# Patient Record
Sex: MALE | Race: WHITE | Employment: FULL TIME | URBAN - METROPOLITAN AREA
[De-identification: names, ages, dates, MRNs, and addresses within clinical notes are randomized per-mention and may not be internally consistent; named-entity substitution may affect disease eponyms.]

---

## 2020-11-25 ENCOUNTER — OFFICE VISIT (OUTPATIENT)
Dept: ORTHOPEDIC SURGERY | Age: 30
End: 2020-11-25
Payer: COMMERCIAL

## 2020-11-25 VITALS — TEMPERATURE: 97.7 F | BODY MASS INDEX: 23.03 KG/M2 | HEIGHT: 72 IN | WEIGHT: 170 LBS

## 2020-11-25 PROCEDURE — 99203 OFFICE O/P NEW LOW 30 MIN: CPT | Performed by: ORTHOPAEDIC SURGERY

## 2020-11-25 PROCEDURE — 29405 APPL SHORT LEG CAST: CPT | Performed by: ORTHOPAEDIC SURGERY

## 2020-11-25 ASSESSMENT — ENCOUNTER SYMPTOMS
RESPIRATORY NEGATIVE: 1
GASTROINTESTINAL NEGATIVE: 1
ALLERGIC/IMMUNOLOGIC COMMENTS: PEANUTS
EYES NEGATIVE: 1
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
## 2020-12-10 ENCOUNTER — TELEPHONE (OUTPATIENT)
Dept: ORTHOPEDIC SURGERY | Age: 30
End: 2020-12-10

## 2020-12-10 ENCOUNTER — OFFICE VISIT (OUTPATIENT)
Dept: ORTHOPEDIC SURGERY | Age: 30
End: 2020-12-10
Payer: COMMERCIAL

## 2020-12-10 VITALS — HEIGHT: 72 IN | WEIGHT: 170 LBS | BODY MASS INDEX: 23.03 KG/M2 | TEMPERATURE: 97.7 F

## 2020-12-10 PROCEDURE — 99213 OFFICE O/P EST LOW 20 MIN: CPT | Performed by: ORTHOPAEDIC SURGERY

## 2020-12-10 PROCEDURE — L4360 PNEUMAT WALKING BOOT PRE CST: HCPCS | Performed by: ORTHOPAEDIC SURGERY

## 2020-12-10 NOTE — LETTER
[facility] [address]  [address] [facility] [address]  Phone: [phone]  Fax: [phone]    Gabi Gutierrez MD        December 10, 2020     Patient: Conor Mims   YOB: 1990   Date of Visit: 12/10/2020       To Whom it May Concern:    Conor Mims was seen in my clinic on 12/10/2020. He is non-weight bearing in boot and on crutches. If you have any questions or concerns, please don't hesitate to call.     Sincerely,           Gabi Gutierrez MD

## 2020-12-10 NOTE — PROGRESS NOTES
Chelle Moralez returns today to follow-up his left ankle. He reports 0 pain during the day but about 4 at nighttime. Has been diligent in his nonweightbearing use of his cast.          Patient's medications, allergies, past medical, surgical, social and family histories were reviewed and updated as appropriate. Relevant review of systems reviewed. General Exam:    Vitals: Temperature 97.7 °F (36.5 °C), temperature source Temporal, height 6' (1.829 m), weight 170 lb (77.1 kg). Constitutional: Patient is adequately groomed with no evidence of malnutrition  Mental Status: The patient is oriented to time, place and person. The patient's mood and affect are appropriate. Removed cast from left ankle today. Neurovascular exams are normal.  Calf is soft. He has mild tenderness laterally over the distal fibula. He has some ecchymosis. He has no pain with passive motion and no crepitation or gross instability. Three-view left ankle x-rays obtained today AP, lateral, and mortise views demonstrate: No displacement of his spiral fibular shaft fracture. Assessment: Stable left fibular shaft fracture. Plan: He will go into a tall boot today. He will remain nonweightbearing he can remove the boot only for hygiene. Follow-up with me in 2 weeks to determine progression of weightbearing. Procedures    Kristie / [address] Tall Walking Boot     Patient was prescribed a Tall Walking Boot. The left ankle will require stabilization / immobilization from this semi-rigid / rigid orthosis to improve their function. The orthosis will assist in protecting the affected area, provide functional support and facilitate healing. Patient was instructed to progress ambulation  as non weight bearing in the device. The plan of care is to progress the patient to full weight bearing status.      The patient was educated and fit by a healthcare professional with expert knowledge and specialization in brace application while under the direct supervision of the physician. Verbal and written instructions for the use of and application of this item were provided. They were instructed to contact the office immediately should the brace result in increased pain, decreased sensation, increased swelling or worsening of the condition. This note was created using voice recognition software. It has been proofread, but occasionally errors remain. Please disregard these errors. They will be corrected as they are noted.

## 2020-12-10 NOTE — TELEPHONE ENCOUNTER
12/10/20 [facility]    -  NO PRECERT REQUIRED - PER HI @ [facility]  REF # [id number]  MP

## 2020-12-14 ENCOUNTER — TELEPHONE (OUTPATIENT)
Dept: ORTHOPEDIC SURGERY | Age: 30
End: 2020-12-14

## 2020-12-23 ENCOUNTER — OFFICE VISIT (OUTPATIENT)
Dept: ORTHOPEDIC SURGERY | Age: 30
End: 2020-12-23
Payer: COMMERCIAL

## 2020-12-23 VITALS — WEIGHT: 175 LBS | TEMPERATURE: 97.7 F | HEIGHT: 72 IN | BODY MASS INDEX: 23.7 KG/M2

## 2020-12-23 PROCEDURE — 99213 OFFICE O/P EST LOW 20 MIN: CPT | Performed by: ORTHOPAEDIC SURGERY

## 2020-12-23 NOTE — LETTER
[facility] [address]  Phone: [phone]  Fax: [phone]    Jonelle Read MD        December 23, 2020     Patient: Curtis Juarez   YOB: 1990   Date of Visit: 12/23/2020       To Whom It May Concern: It is my medical opinion that Curtis Juarez must wear the boot and use crutches. He will be reevaluated in 3 weeks. If you have any questions or concerns, please don't hesitate to call.     Sincerely,        Jonelle Read MD

## 2020-12-23 NOTE — PROGRESS NOTES
Carlene Dickerson returns today for his left fibula fracture. He is doing well and reports no pain. I have reviewed the patient's medical history in detail and updated the computerized patient record. General Exam:    Vitals: Temperature 97.7 °F (36.5 °C), temperature source Temporal, height 6' (1.829 m), weight 175 lb (79.4 kg). Constitutional: Patient is adequately groomed with no evidence of malnutrition  Mental Status: The patient is oriented to time, place and person. The patient's mood and affect are appropriate. Left ankle today is nontender. He has no pain with flexion or extension. No pain with inversion or eversion. No pain over the distal fibula. He has no swelling. Three-view x-rays left ankle obtained today AP, lateral, and mortise views demonstrate: Early callus formation without displacement of his spiral fibular shaft fracture. Assessment: Stable and healing left fibula fracture. Plan: He can begin to gradually bear weight. He will follow-up with me in 3 weeks. This note was created using voice recognition software. It has been proofread, but occasionally errors remain. Please disregard these errors. They will be corrected as they are noted.

## 2021-01-14 ENCOUNTER — OFFICE VISIT (OUTPATIENT)
Dept: ORTHOPEDIC SURGERY | Age: 31
End: 2021-01-14
Payer: COMMERCIAL

## 2021-01-14 VITALS — WEIGHT: 175 LBS | TEMPERATURE: 97.1 F | HEIGHT: 72 IN | BODY MASS INDEX: 23.7 KG/M2

## 2021-01-14 DIAGNOSIS — M25.572 ACUTE LEFT ANKLE PAIN: Primary | ICD-10-CM

## 2021-01-14 DIAGNOSIS — S82.445D CLOSED NONDISPLACED SPIRAL FRACTURE OF SHAFT OF LEFT FIBULA WITH ROUTINE HEALING, SUBSEQUENT ENCOUNTER: ICD-10-CM

## 2021-01-14 PROCEDURE — 99213 OFFICE O/P EST LOW 20 MIN: CPT | Performed by: ORTHOPAEDIC SURGERY

## 2021-01-14 NOTE — PROGRESS NOTES
Viral Mcclure returns today to follow-up his left ankle. He reports no pain. He still been hesitant to bear weight but is pain-free at this time. Past medical, surgical, social histories have been reviewed and updated. Relevant review of systems reviewed. General Exam:    Vitals: Temperature 97.1 °F (36.2 °C), temperature source Temporal, height 6' (1.829 m), weight 175 lb (79.4 kg). Constitutional: Patient is adequately groomed with no evidence of malnutrition  Mental Status: The patient is oriented to time, place and person. The patient's mood and affect are appropriate. Left ankle today has no tenderness. He has atrophy of the calf. Sensation is normal.  Mild restriction in motion but no significant swelling. Calf is soft. Three-view left ankle x-rays obtained that AP, lateral, and mortise views demonstrate: Continued consolidation of his spiral fibular shaft fracture. No evidence of ankle joint incongruity. Assessment:  Continues to heal his left ankle fracture. Plan: He can bear weight to tolerance of the boot. Were to start therapy with an emphasis on strength and stability. He can resume light duty work when he is comfortable and off his crutches. Follow-up with me in a month. This note was created using voice recognition software. It has been proofread, but occasionally errors remain. Please disregard these errors. They will be corrected as they are noted.

## 2021-01-14 NOTE — LETTER
[facility] [address] [place] [id number]  Phone: [phone]  Fax: [phone]    Ivy Bauer MD        January 14, 2021     Patient: Christal Agarwal   YOB: 1990   Date of Visit: 1/14/2021       To Whom it May Concern:    Christal Agarwal was seen in my clinic on 1/14/2021. He is weight bearing as tolerated in boot. He will return to light duty as soon as possible. He will start physical therapy and will follow up in 1 month. If you have any questions or concerns, please don't hesitate to call.     Sincerely,           Ivy Bauer MD

## 2021-01-19 ENCOUNTER — HOSPITAL ENCOUNTER (OUTPATIENT)
Dept: PHYSICAL THERAPY | Age: 31
Setting detail: THERAPIES SERIES
Discharge: HOME | End: 2021-01-19
Payer: COMMERCIAL

## 2021-01-19 PROCEDURE — 97110 THERAPEUTIC EXERCISES: CPT | Performed by: PHYSICAL THERAPIST

## 2021-01-19 PROCEDURE — 97161 PT EVAL LOW COMPLEX 20 MIN: CPT | Performed by: PHYSICAL THERAPIST

## 2021-01-19 PROCEDURE — 97530 THERAPEUTIC ACTIVITIES: CPT | Performed by: PHYSICAL THERAPIST
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
## 2021-01-19 NOTE — FLOWSHEET NOTE
comments   ROM     ABC'S 1x    BAPS NV? Circles 30x CW/CCW   Ankle Pumps 30x    Inversion/Eversion 30x         Rocks     Towels     Toe curls     Bottle Roll               Stretching     Towel Pull 1 3x:30 Progress NV   Towel Pull 2 3x:30 Progress NV   Calf 1     Calf 2     Incline Stretch     Stair Stretch     Pro-Stretch     Toe Extension     ERMI          Hamstring                         Isometrics     Dorsiflexion 10x:10    Plantarflexion 10x:10    Inversion 10x:10    Eversion 10x:10         PRE's     Dorsiflexion     Plantarflexion     Inversion     Eversion          SLR flex     SLR ABD     SLR ADD     SLR ext          Calf Raises     Calf Raises off step          Soleus Calf raises               Step Up          Knee Extension     Hamstring Curls               Leg Press               Balance:     Rocker Board     BOSU     SLS     Aeromat     Foam Roll     Plyoback     Tandem Stance     Biodex          Bike     Treadmill          Cone Walks                          Access Code: [email or web address]   URL: [email or web address]   Date: 01/19/2021   Prepared by: Katya Nash Cessna     Exercises  Supine Ankle Pumps - 10 reps - 3 sets - 2x daily - 7x weekly  Supine Ankle Inversion Eversion AROM - 10 reps - 3 sets - 2x daily - 7x weekly  Supine Ankle Circles - 10 reps - 3 sets - 2x daily - 7x weekly  Seated Ankle Alphabet - 10 reps - 3 sets - 2x daily - 7x weekly  Long Sitting Calf Stretch with Strap - 5 sets - 30 hold - 2x daily - 7x weekly  Seated Soleus Stretch with Strap - 5 sets - 30 hold - 2x daily - 7x weekly  Isometric Ankle Eversion at Wall - 10 reps - 10 hold - 1-2x daily - 7x weekly  Isometric Ankle Inversion at Wall - 10 reps - 10 hold - 1-2x daily - 7x weekly  Isometric Ankle Dorsiflexion and Plantarflexion - 10 reps - 10 hold - 1-2x daily - 7x weekly  Long Sitting Isometric Ankle Plantarflexion with Ball at [place] - 10 reps - 10 hold - 1-2x daily - 7x weekly      Therapeutic Exercise and NMR EXR  [x] (50491) Provided verbal/tactile cueing for activities related to strengthening, flexibility, endurance, ROM for improvements in LE, proximal hip, and core control with self care, mobility, lifting, ambulation. [x] (38747) Provided verbal/tactile cueing for activities related to improving balance, coordination, kinesthetic sense, posture, motor skill, proprioception  to assist with LE, proximal hip, and core control in self care, mobility, lifting, ambulation and eccentric single leg control.      NMR and Therapeutic Activities:    [x] (76828 or 93037) Provided verbal/tactile cueing for activities related to improving balance, coordination, kinesthetic sense, posture, motor skill, proprioception and motor activation to allow for proper function of core, proximal hip and LE with self care and ADLs  [x] (49810) Gait Re-education- Provided training and instruction to the patient for proper LE, core and proximal hip recruitment and positioning and eccentric body weight control with ambulation re-education including up and down stairs     Home Exercise Program:    [x] (98780) Reviewed/Progressed HEP activities related to strengthening, flexibility, endurance, ROM of core, proximal hip and LE for functional self-care, mobility, lifting and ambulation/stair navigation   [x] (62643)Reviewed/Progressed HEP activities related to improving balance, coordination, kinesthetic sense, posture, motor skill, proprioception of core, proximal hip and LE for self care, mobility, lifting, and ambulation/stair navigation      Manual Treatments:  PROM / STM / Oscillations-Mobs:  G-I, II, III, IV (PA's, Inf., Post.)  [x] (01588) Provided manual therapy to mobilize LE, proximal hip and/or LS spine soft tissue/joints for the purpose of modulating pain, promoting relaxation,  increasing ROM, reducing/eliminating soft tissue swelling/inflammation/restriction, improving soft tissue extensibility and allowing for proper ROM for normal function with self care, mobility, lifting and ambulation. Modalities:      Charges:  Timed Code Treatment Minutes: 25'   Total Treatment Minutes: 54'     [x] EVAL (LOW) 74538   [] EVAL (MOD) 48663   [] EVAL (HIGH) 70185   [] RE-EVAL   [x] MIRNA(91404) x 1    [] IONTO  [] NMR (94254) x     [] VASO  [] Manual (95093) x      [] Other:  [x] TA x 1     [] Mech Traction (51557)  [] ES(attended) (09649)      [] ES (un) (38749):         GOALS:   Patient stated goal: regain ability to be on feet all day for work; athleticism     [] Progressing: [] Met: [] Not Met: [] Adjusted    Therapist goals for Patient:   Short Term Goals: To be achieved in: 2 weeks  1. Independent in HEP and progression per patient tolerance, in order to prevent re-injury. [] Progressing: [] Met: [] Not Met: [] Adjusted   2. Patient will have a decrease in pain of 4/10 at worst to facilitate improvement in movement, function, and ADLs as indicated by Functional Deficits. [] Progressing: [] Met: [] Not Met: [] Adjusted    Long Term Goals: To be achieved in: 6 weeks  1. Pt will score 90% or better for the LEFS to assist with reaching prior level of function. [] Progressing: [] Met: [] Not Met: [] Adjusted  2. Patient will demonstrate increased AROM in dorsiflexion greater than or equal to 10, plantarflexion greater than or equal to 50, inversion greater than or equal to 35 to allow for proper joint functioning as indicated by patients functional deficits. [] Progressing: [] Met: [] Not Met: [] Adjusted  3. Patient will demonstrate an increase in strength greater than or equal to 4+ to allow for proper functional mobility as indicated by patients functional deficits. [] Progressing: [] Met: [] Not Met: [] Adjusted  4. Patient will return to work related activities without increased symptoms or restriction. [] Progressing: [] Met: [] Not Met: [] Adjusted  5. Pt will report pain at worst less than or equal to 2/10.   [] Progressing: [] Met: [] Not Met: [] Adjusted   5. Pt will return to sport related activities without c/o. [] Progressing: [] Met: [] Not Met: [] Adjusted    Overall Progression Towards Functional goals/ Treatment Progress Update:  [] Patient is progressing as expected towards functional goals listed. [] Progression is slowed due to complexities/Impairments listed. [] Progression has been slowed due to co-morbidities. [x] Plan just implemented, too soon to assess goals progression <30days   [] Goals require adjustment due to lack of progress  [] Patient is not progressing as expected and requires additional follow up with physician  [] Other    Prognosis for POC: [x] Good [] Fair  [] Poor      Patient requires continued skilled intervention: [x] Yes  [] No    Treatment/Activity Tolerance:  [x] Patient able to complete treatment  [] Patient limited by fatigue  [] Patient limited by pain     [] Patient limited by other medical complications  [] Other: Pt is a 36 y/o male presenting with diagnosis of healing left ankle fracture from the MD.  Clinically, the pt presents with decreased ROM, decreased strength, decreased function, and increased pain consistent with the MD diagnosis. The pt would benefit from skilled PT to return to PLOF. Pt has been immobilized for several weeks. 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 He also wants to return to playing volleyball. He has a 3 y/o son he cares for and enjoys playing with. He does walk on his toe in the boot for speed. We did discuss proper gt with boot and that he could use the crutches if needed. We did review the pt's benefits and addressed any questions. Pt was agreeable. PLAN: If pt doesn't return, this note can be considered a D/C note. See eval.  Consider bike in shoe, cone walks, biodex.    [] Continue per plan of care [] Alter current plan (see comments above)  [x] Plan of care initiated [] Hold pending MD visit [] Discharge  Electronically signed by: Alessandro, DPT [id number]

## 2021-01-27 ENCOUNTER — HOSPITAL ENCOUNTER (OUTPATIENT)
Dept: PHYSICAL THERAPY | Age: 31
Setting detail: THERAPIES SERIES
Discharge: HOME | End: 2021-01-27
Payer: COMMERCIAL

## 2021-01-27 PROCEDURE — 97110 THERAPEUTIC EXERCISES: CPT | Performed by: PHYSICAL THERAPIST

## 2021-01-27 PROCEDURE — 97116 GAIT TRAINING THERAPY: CPT | Performed by: PHYSICAL THERAPIST

## 2021-01-27 PROCEDURE — 97530 THERAPEUTIC ACTIVITIES: CPT | Performed by: PHYSICAL THERAPIST

## 2021-01-28 ENCOUNTER — TELEPHONE (OUTPATIENT)
Dept: ORTHOPEDIC SURGERY | Age: 31
End: 2021-01-28

## 2021-01-28 NOTE — LETTER
[facility] [address] [address] Jazmyn Padilla [address]  Phone: [phone]  Fax: [phone]    Farrah Escalona MD        January 28, 2021     Patient: Carlos Peter   YOB: 1990   Date of Visit: 1/28/2021       To Whom It May Concern: It is my medical opinion that Carlos Peter may return to work light duty on 2-1-21. He must wear boot on left ankle. If you have any questions or concerns, please don't hesitate to call.     Sincerely,          Farrah Escalona MD

## 2021-02-03 ENCOUNTER — HOSPITAL ENCOUNTER (OUTPATIENT)
Dept: PHYSICAL THERAPY | Age: 31
Setting detail: THERAPIES SERIES
Discharge: HOME | End: 2021-02-03
Payer: COMMERCIAL

## 2021-02-03 PROCEDURE — 97112 NEUROMUSCULAR REEDUCATION: CPT | Performed by: PHYSICAL THERAPIST

## 2021-02-03 PROCEDURE — 97110 THERAPEUTIC EXERCISES: CPT | Performed by: PHYSICAL THERAPIST

## 2021-02-03 PROCEDURE — 97530 THERAPEUTIC ACTIVITIES: CPT | Performed by: PHYSICAL THERAPIST

## 2021-02-03 NOTE — FLOWSHEET NOTE
Orthopaedics and [address] [place] [address] DR JOHN MORRISON                   Physical Therapy Treatment Note/ Progress Report:           Date:  2/3/2021    Patient Name:  Dennise Barroso    :  1990  MRN: [id number]  Restrictions/Precautions:    Medical/Treatment Diagnosis Information:  · Diagnosis: healing left ankle fracture. M25.572 (ICD-10-CM) - Acute left ankle pain. Onset-2020  · Treatment Diagnosis: M25.572 (ICD-10-CM) - Acute left ankle pain  Insurance/Certification information:  PT Insurance Information: Yessi  Physician Information:  Referring Practitioner: Zahira Oro  Has the plan of care been signed (Y/N):        []  Yes  [x]  No     Date of Patient follow up with Physician: 15 Feb 21      Is this a Progress Report:     []  Yes  [x]  No        If Yes:  Date Range for reporting period:  Beginning 21  Ending    Progress report will be due (10 Rx or 30 days whichever is less): visit 10 or       Recertification will be due (POC Duration  / 90 days whichever is less): see above        Visit # Insurance Allowable Auth Required   3 60 []  Yes [x]  No        Functional Scale: UEFI-52.5%   Date assessed: 2021    Latex Allergy:  [x]NO      []YES  Preferred Language for Healthcare:   [x]English       []other:    Pain level: 0/10    SUBJECTIVE:  He has returned to work. He has been feeling ok. His foot is tired by the end of the day though. He has been walking without his boot at home without c/o.      OBJECTIVE: 3 Feb 21    Observation:    Test measurements:       ROM PROM AROM Comments    Left Right Left Right    Dorsiflexion   9     Plantarflexion   57     Inversion   32     Eversion   15             Knee flexion        Knee extension                  Strength Left Right Comments   Dorsiflexion      Plantarflexion      Inversion      Eversion          Girth Left Right Comments   Figure 8      Transmalleolar      MTP                    Balance-SL Left Right   EO      EC     EO foam EC foam                RESTRICTIONS/PRECAUTIONS: allergic to peanuts    Exercises/Interventions:     Exercise/Equipment Resistance/Repetitions Other comments   ROM     ABC'S 1x    BAPS     Circles 30x CW/CCW   Ankle Pumps 30x    Inversion/Eversion 30x         Rocks     Towels     Toe curls     Bottle Roll               Stretching     Towel Pull 1    Towel Pull 2    Calf 1     Calf 2     Incline Stretch 5x:30 gastroc and soleus   Stair Stretch     Pro-Stretch     Toe Extension     ERMI          Hamstring                         Isometrics     Dorsiflexion    Plantarflexion    Inversion    Eversion         PRE's     Dorsiflexion 3x10 2 lg wts    Plantarflexion     Inversion 3x10 1 lg wt    Eversion 3x10 1 lg wt         SLR flex     SLR ABD     SLR ADD     SLR ext          Calf Raises 3x10 wt shift to left    Calf Raises off step          Soleus Calf raises               Step Up          Knee Extension     Hamstring Curls               Leg Press               Balance:     Rocker Board     BOSU     SLS 5x:30    Aeromat     Foam Roll     Plyoback     Tandem Stance     Biodex   3x LOS L9  3x maze medium L9         Bike 8' L3    Treadmill          Cone Walks 10x without boot Cuing for proper form                        Access Code: [email or web address]  URL: [email or web address]  Date: 02/03/2021  Prepared by: Frankie Crowena    Exercises  Supine Ankle Pumps - 10 reps - 3 sets - 2x daily - 7x weekly  Supine Ankle Inversion Eversion AROM - 10 reps - 3 sets - 2x daily - 7x weekly  Supine Ankle Circles - 10 reps - 3 sets - 2x daily - 7x weekly  Seated Ankle Alphabet - 10 reps - 3 sets - 2x daily - 7x weekly  Gastroc Stretch on Wall - 5 sets - 30 hold - 2x daily - 7x weekly  Soleus Stretch on Wall - 5 sets - 30 hold - 2x daily - 7x weekly  Long Sitting Ankle Dorsiflexion with Anchored Resistance - 10 reps - 3 sets - 2x daily - 7x weekly  Long Sitting Ankle Plantar Flexion with Resistance - 10 reps - 3 sets - 2x daily - 7x weekly  Long Sitting Ankle Inversion with Resistance - 10 reps - 3 sets - 2x daily - 7x weekly  Long Sitting Ankle Eversion with Resistance - 10 reps - 3 sets - 2x daily - 7x weekly  Single Leg Stance - 5 sets - 30 hold - 1-2x daily - 7x weekly  Standing Heel Raise - 10 reps - 3 sets - 1-2x daily - 7x weekly    Therapeutic Exercise and NMR EXR  [x] (09124) Provided verbal/tactile cueing for activities related to strengthening, flexibility, endurance, ROM for improvements in LE, proximal hip, and core control with self care, mobility, lifting, ambulation. [x] (88638) Provided verbal/tactile cueing for activities related to improving balance, coordination, kinesthetic sense, posture, motor skill, proprioception  to assist with LE, proximal hip, and core control in self care, mobility, lifting, ambulation and eccentric single leg control.      NMR and Therapeutic Activities:    [x] (63845 or 05624) Provided verbal/tactile cueing for activities related to improving balance, coordination, kinesthetic sense, posture, motor skill, proprioception and motor activation to allow for proper function of core, proximal hip and LE with self care and ADLs  [x] (40320) Gait Re-education- Provided training and instruction to the patient for proper LE, core and proximal hip recruitment and positioning and eccentric body weight control with ambulation re-education including up and down stairs     Home Exercise Program:    [x] (56484) Reviewed/Progressed HEP activities related to strengthening, flexibility, endurance, ROM of core, proximal hip and LE for functional self-care, mobility, lifting and ambulation/stair navigation   [x] (71101)Reviewed/Progressed HEP activities related to improving balance, coordination, kinesthetic sense, posture, motor skill, proprioception of core, proximal hip and LE for self care, mobility, lifting, and ambulation/stair navigation      Manual Treatments:  PROM / STM / Oscillations-Mobs:  G-I, II, III, IV (PA's, Inf., Post.)  [x] (98819) Provided manual therapy to mobilize LE, proximal hip and/or LS spine soft tissue/joints for the purpose of modulating pain, promoting relaxation,  increasing ROM, reducing/eliminating soft tissue swelling/inflammation/restriction, improving soft tissue extensibility and allowing for proper ROM for normal function with self care, mobility, lifting and ambulation. Modalities:      Charges:   Timed Code Treatment Minutes: 40'   Total Treatment Minutes: 61'     [] EVAL (LOW) 455 1011   [] EVAL (MOD) 76208   [] EVAL (HIGH) 83734   [] RE-EVAL   [x] PA(39627) x 1    [] IONTO  [x] NMR (93945) x1     [] VASO  [] Manual (04257) x      [] Other: gt  [x] TA x 1     [] Mech Traction (61171)  [] ES(attended) (52706)      [] ES (un) (83321):         GOALS:   Patient stated goal: regain ability to be on feet all day for work; athleticism     [] Progressing: [] Met: [] Not Met: [] Adjusted    Therapist goals for Patient:   Short Term Goals: To be achieved in: 2 weeks  1. Independent in HEP and progression per patient tolerance, in order to prevent re-injury. [] Progressing: [] Met: [] Not Met: [] Adjusted   2. Patient will have a decrease in pain of 4/10 at worst to facilitate improvement in movement, function, and ADLs as indicated by Functional Deficits. [] Progressing: [] Met: [] Not Met: [] Adjusted    Long Term Goals: To be achieved in: 6 weeks  1. Pt will score 90% or better for the LEFS to assist with reaching prior level of function. [] Progressing: [] Met: [] Not Met: [] Adjusted  2. Patient will demonstrate increased AROM in dorsiflexion greater than or equal to 10, plantarflexion greater than or equal to 50, inversion greater than or equal to 35 to allow for proper joint functioning as indicated by patients functional deficits. [] Progressing: [] Met: [] Not Met: [] Adjusted  3.  Patient will demonstrate an increase in strength greater than or equal to 4+ to allow for proper functional mobility as indicated by patients functional deficits. [] Progressing: [] Met: [] Not Met: [] Adjusted  4. Patient will return to work related activities without increased symptoms or restriction. [] Progressing: [] Met: [] Not Met: [] Adjusted  5. Pt will report pain at worst less than or equal to 2/10. [] Progressing: [] Met: [] Not Met: [] Adjusted   5. Pt will return to sport related activities without c/o. [] Progressing: [] Met: [] Not Met: [] Adjusted    Overall Progression Towards Functional goals/ Treatment Progress Update:  [] Patient is progressing as expected towards functional goals listed. [] Progression is slowed due to complexities/Impairments listed. [] Progression has been slowed due to co-morbidities. [x] Plan just implemented, too soon to assess goals progression <30days   [] Goals require adjustment due to lack of progress  [] Patient is not progressing as expected and requires additional follow up with physician  [] Other    Prognosis for POC: [x] Good [] Fair  [] Poor      Patient requires continued skilled intervention: [x] Yes  [] No    Treatment/Activity Tolerance:  [x] Patient able to complete treatment  [] Patient limited by fatigue  [] Patient limited by pain     [] Patient limited by other medical complications  [] Other: Pt adam tx well. He has been able to progress significantly including his gt. He was unable to do single leg calf raise without compensation. He adam progression to ankle isolator well and without c/o. He demo good improvements with ROM as well. Pt would continue to benefit from skilled PT to improve strength, ROM, and function. PLAN: If pt doesn't return, this note can be considered a D/C note. Progress gt, balance, and strength. Consider soleus calf raise.    [x] Continue per plan of care [] Alter current plan (see comments above)  [] Plan of care initiated [] Hold pending MD visit [] Discharge  Electronically signed by: Alec Chandra Shimon PT, DPT [id number]

## 2021-02-11 ENCOUNTER — HOSPITAL ENCOUNTER (OUTPATIENT)
Dept: PHYSICAL THERAPY | Age: 31
Setting detail: THERAPIES SERIES
Discharge: HOME | End: 2021-02-11
Payer: COMMERCIAL

## 2021-02-11 PROCEDURE — 97530 THERAPEUTIC ACTIVITIES: CPT | Performed by: PHYSICAL THERAPIST

## 2021-02-11 PROCEDURE — 97112 NEUROMUSCULAR REEDUCATION: CPT | Performed by: PHYSICAL THERAPIST

## 2021-02-11 PROCEDURE — 97110 THERAPEUTIC EXERCISES: CPT | Performed by: PHYSICAL THERAPIST

## 2021-02-11 NOTE — FLOWSHEET NOTE
Orthopaedics and [address] [place] [address] DR JOHN MORRISON                   Physical Therapy Treatment Note/ Progress Report:           Date:  2021    Patient Name:  Edda Gold    :  1990  MRN: [id number]  Restrictions/Precautions:    Medical/Treatment Diagnosis Information:  · Diagnosis: healing left ankle fracture. M25.572 (ICD-10-CM) - Acute left ankle pain. Onset-2020  · Treatment Diagnosis: M25.572 (ICD-10-CM) - Acute left ankle pain  Insurance/Certification information:  PT Insurance Information: [place]  Physician Information:  Referring Practitioner: Maira Guerrero  Has the plan of care been signed (Y/N):        []  Yes  [x]  No     Date of Patient follow up with Physician: 15 Feb 21      Is this a Progress Report:     []  Yes  [x]  No        If Yes:  Date Range for reporting period:  Beginning 21  Ending    Progress report will be due (10 Rx or 30 days whichever is less): visit 10 or       Recertification will be due (POC Duration  / 90 days whichever is less): see above        Visit # Insurance Allowable Auth Required   4 60 []  Yes [x]  No        Functional Scale: UEFI-52.5%   Date assessed: 2021    Latex Allergy:  [x]NO      []YES  Preferred Language for Healthcare:   [x]English       []other:    Pain level: 0/10    SUBJECTIVE:  He tried to go out of the boot at work on Monday, and he was swollen and sore afterwards.       OBJECTIVE: 3 Feb 21    Observation:    Test measurements:       ROM PROM AROM Comments    Left Right Left Right    Dorsiflexion   9     Plantarflexion   57     Inversion   32     Eversion   15             Knee flexion        Knee extension                  Strength Left Right Comments   Dorsiflexion      Plantarflexion      Inversion      Eversion          Girth Left Right Comments   Figure 8      Transmalleolar      MTP                    Balance-SL Left Right   EO      EC     EO foam     EC foam                RESTRICTIONS/PRECAUTIONS: allergic to peanuts    Exercises/Interventions:     Exercise/Equipment Resistance/Repetitions Other comments   ROM     ABC'S 1x    BAPS     Circles 30x CW/CCW   Ankle Pumps 30x    Inversion/Eversion 30x         Rocks     Towels     Toe curls     Bottle Roll               Stretching     Towel Pull 1    Towel Pull 2    Calf 1     Calf 2     Incline Stretch 5x:30 gastroc and soleus   Stair Stretch     Pro-Stretch     Toe Extension     ERMI          Hamstring                         Isometrics     Dorsiflexion    Plantarflexion    Inversion    Eversion         PRE's     Dorsiflexion 3x10 2 lg wts    Plantarflexion 3x10 1 lg wts    Inversion 3x10 1 lg wt    Eversion 3x10 1 lg wt         SLR flex     SLR ABD     SLR ADD     SLR ext          Calf Raises 3x10 ECL    Calf Raises off step          Soleus Calf raises               Step Up          Knee Extension     Hamstring Curls               Leg Press               Balance:     Rocker Board 3x10 taps A/P SL  30x tap L/R BLE    BOSU Step up and pause x10    SLS 5x:30    Aeromat     Foam Roll     Plyoback     Tandem Stance     Biodex   3x maze medium L9  RC 3' L9    Balance beam Tandem fwd/bwd-x5  [place] L/R x5                   Bike 8' L6  3' warm up; :30 up tempo/:30 recovery for last 5'    Treadmill          Cone Walks 10x without boot Cuing for proper form                        Access Code: [email or web address]  URL: [email or web address]  Date: 02/03/2021  Prepared by: Elyssa Guidry    Exercises  Supine Ankle Pumps - 10 reps - 3 sets - 2x daily - 7x weekly  Supine Ankle Inversion Eversion AROM - 10 reps - 3 sets - 2x daily - 7x weekly  Supine Ankle Circles - 10 reps - 3 sets - 2x daily - 7x weekly  Seated Ankle Alphabet - 10 reps - 3 sets - 2x daily - 7x weekly  Gastroc Stretch on Wall - 5 sets - 30 hold - 2x daily - 7x weekly  Soleus Stretch on Wall - 5 sets - 30 hold - 2x daily - 7x weekly  Long Sitting Ankle Dorsiflexion with Anchored Resistance - 10 reps - 3 sets - 2x daily - 7x weekly  Long Sitting Ankle Plantar Flexion with Resistance - 10 reps - 3 sets - 2x daily - 7x weekly  Long Sitting Ankle Inversion with Resistance - 10 reps - 3 sets - 2x daily - 7x weekly  Long Sitting Ankle Eversion with Resistance - 10 reps - 3 sets - 2x daily - 7x weekly  Single Leg Stance - 5 sets - 30 hold - 1-2x daily - 7x weekly  Standing Heel Raise - 10 reps - 3 sets - 1-2x daily - 7x weekly    Therapeutic Exercise and NMR EXR  [x] (00838) Provided verbal/tactile cueing for activities related to strengthening, flexibility, endurance, ROM for improvements in LE, proximal hip, and core control with self care, mobility, lifting, ambulation. [x] (37754) Provided verbal/tactile cueing for activities related to improving balance, coordination, kinesthetic sense, posture, motor skill, proprioception  to assist with LE, proximal hip, and core control in self care, mobility, lifting, ambulation and eccentric single leg control.      NMR and Therapeutic Activities:    [x] (07022 or 16868) Provided verbal/tactile cueing for activities related to improving balance, coordination, kinesthetic sense, posture, motor skill, proprioception and motor activation to allow for proper function of core, proximal hip and LE with self care and ADLs  [x] (69612) Gait Re-education- Provided training and instruction to the patient for proper LE, core and proximal hip recruitment and positioning and eccentric body weight control with ambulation re-education including up and down stairs     Home Exercise Program:    [x] (78666) Reviewed/Progressed HEP activities related to strengthening, flexibility, endurance, ROM of core, proximal hip and LE for functional self-care, mobility, lifting and ambulation/stair navigation   [x] (80449)Reviewed/Progressed HEP activities related to improving balance, coordination, kinesthetic sense, posture, motor skill, proprioception of core, proximal hip and LE for self care, mobility, lifting, and ambulation/stair navigation      Manual Treatments:  PROM / STM / Oscillations-Mobs:  G-I, II, III, IV (PA's, Inf., Post.)  [x] (21375) Provided manual therapy to mobilize LE, proximal hip and/or LS spine soft tissue/joints for the purpose of modulating pain, promoting relaxation,  increasing ROM, reducing/eliminating soft tissue swelling/inflammation/restriction, improving soft tissue extensibility and allowing for proper ROM for normal function with self care, mobility, lifting and ambulation. Modalities:      Charges:   Timed Code Treatment Minutes: 48'   Total Treatment Minutes: 79'     [] EVAL (LOW) 455 1011   [] EVAL (MOD) 20155   [] EVAL (HIGH) 59155   [] RE-EVAL   [x] WR(67376) x 1    [] IONTO  [x] NMR (52813) x2     [] VASO  [] Manual (74674) x      [] Other: gt  [x] TA x 1     [] Mech Traction (95221)  [] ES(attended) (84134)      [] ES (un) (59736):         GOALS:   Patient stated goal: regain ability to be on feet all day for work; athleticism     [] Progressing: [] Met: [] Not Met: [] Adjusted    Therapist goals for Patient:   Short Term Goals: To be achieved in: 2 weeks  1. Independent in HEP and progression per patient tolerance, in order to prevent re-injury. [] Progressing: [] Met: [] Not Met: [] Adjusted   2. Patient will have a decrease in pain of 4/10 at worst to facilitate improvement in movement, function, and ADLs as indicated by Functional Deficits. [] Progressing: [] Met: [] Not Met: [] Adjusted    Long Term Goals: To be achieved in: 6 weeks  1. Pt will score 90% or better for the LEFS to assist with reaching prior level of function. [] Progressing: [] Met: [] Not Met: [] Adjusted  2. Patient will demonstrate increased AROM in dorsiflexion greater than or equal to 10, plantarflexion greater than or equal to 50, inversion greater than or equal to 35 to allow for proper joint functioning as indicated by patients functional deficits.    [] Progressing: [] Met: [] Not Met: [] Adjusted  3. Patient will demonstrate an increase in strength greater than or equal to 4+ to allow for proper functional mobility as indicated by patients functional deficits. [] Progressing: [] Met: [] Not Met: [] Adjusted  4. Patient will return to work related activities without increased symptoms or restriction. [] Progressing: [] Met: [] Not Met: [] Adjusted  5. Pt will report pain at worst less than or equal to 2/10. [] Progressing: [] Met: [] Not Met: [] Adjusted   5. Pt will return to sport related activities without c/o. [] Progressing: [] Met: [] Not Met: [] Adjusted    Overall Progression Towards Functional goals/ Treatment Progress Update:  [] Patient is progressing as expected towards functional goals listed. [] Progression is slowed due to complexities/Impairments listed. [] Progression has been slowed due to co-morbidities. [x] Plan just implemented, too soon to assess goals progression <30days   [] Goals require adjustment due to lack of progress  [] Patient is not progressing as expected and requires additional follow up with physician  [] Other    Prognosis for POC: [x] Good [] Fair  [] Poor      Patient requires continued skilled intervention: [x] Yes  [] No    Treatment/Activity Tolerance:  [x] Patient able to complete treatment  [] Patient limited by fatigue  [] Patient limited by pain     [] Patient limited by other medical complications  [] Other: Pt adam tx well. He continues to have weaker gastroc strength as demonstrated by calf raises, and he continues to improve. He is motivated to progress though. I did ask him to progress calf raises as he can at home. We reviewed how to do so. We also discussed different ways to progress his balance and reviewed his benefits. We did speak about a brace as he does want to return to playing volleyball in April if possible. Pt would continue to benefit from skilled PT to improve strength, ROM, and function.       PLAN: If pt doesn't return, this note can be considered a D/C note. Progress gt, balance, and strength. Consider soleus calf raise on machine.   [x] Continue per plan of care [] Alter current plan (see comments above)  [] Plan of care initiated [] Hold pending MD visit [] Discharge  Electronically signed by: Amina Islas DPT [id number]

## 2021-02-15 ENCOUNTER — OFFICE VISIT (OUTPATIENT)
Dept: ORTHOPEDIC SURGERY | Age: 31
End: 2021-02-15
Payer: COMMERCIAL

## 2021-02-15 VITALS
WEIGHT: 175 LBS | HEIGHT: 72 IN | DIASTOLIC BLOOD PRESSURE: 80 MMHG | BODY MASS INDEX: 23.7 KG/M2 | RESPIRATION RATE: 12 BRPM | SYSTOLIC BLOOD PRESSURE: 134 MMHG | TEMPERATURE: 96.5 F | HEART RATE: 87 BPM

## 2021-02-15 DIAGNOSIS — S82.445D CLOSED NONDISPLACED SPIRAL FRACTURE OF SHAFT OF LEFT FIBULA WITH ROUTINE HEALING, SUBSEQUENT ENCOUNTER: ICD-10-CM

## 2021-02-15 DIAGNOSIS — M25.572 ACUTE LEFT ANKLE PAIN: Primary | ICD-10-CM

## 2021-02-15 PROCEDURE — 99214 OFFICE O/P EST MOD 30 MIN: CPT | Performed by: ORTHOPAEDIC SURGERY

## 2021-02-15 PROCEDURE — L1902 AFO ANKLE GAUNTLET PRE OTS: HCPCS | Performed by: ORTHOPAEDIC SURGERY

## 2021-02-15 NOTE — PROGRESS NOTES
Addy Tejeda returns today to follow-up his left ankle. He reports no pain. I spoke with his therapist he feels like he is making good progress. Patient's medications, allergies, past medical, surgical, social and family histories were reviewed and updated as appropriate. Relevant review of systems reviewed. General Exam:    Vitals: Blood pressure (!) 141/71, pulse 80, temperature 96.5 °F (35.8 °C), temperature source Infrared, resp. rate 12, height 6' (1.829 m), weight 175 lb (79.4 kg). Constitutional: Patient is adequately groomed with no evidence of malnutrition  Mental Status: The patient is oriented to time, place and person. The patient's mood and affect are appropriate. Left ankle today has some mild lateral fullness. He has no tenderness. Is no pain with single leg stance or toe raise on the left side. Calf is soft. Neurologic and vascular exams left ankle are normal.    Left ankle x-rays were obtained today and personally interpreted by me. AP, lateral, and mortise views demonstrate: Abundant callus formation but a persistent fracture line noticed principally on the lateral x-ray. Assessment: Slowly healing left distal fibula fracture. Plan: He will be fit with a lace up ankle brace today. He should continue with his exercises. He can resume volleyball in April. He needs to wear his ankle brace underneath his work boots. From a work standpoint he will be limited to lifting 100 pounds and no work on a ladder above 6 feet. Follow-up with me in a month with anticipated return to full duty work at that time. This note was created using voice recognition software. It has been proofread, but occasionally errors remain. Please disregard these errors. They will be corrected as they are noted.

## 2021-02-22 ENCOUNTER — HOSPITAL ENCOUNTER (OUTPATIENT)
Dept: PHYSICAL THERAPY | Age: 31
Setting detail: THERAPIES SERIES
Discharge: HOME | End: 2021-02-22
Payer: COMMERCIAL

## 2021-02-22 PROCEDURE — 97112 NEUROMUSCULAR REEDUCATION: CPT | Performed by: PHYSICAL THERAPIST

## 2021-02-22 PROCEDURE — 97110 THERAPEUTIC EXERCISES: CPT | Performed by: PHYSICAL THERAPIST

## 2021-02-22 PROCEDURE — 97530 THERAPEUTIC ACTIVITIES: CPT | Performed by: PHYSICAL THERAPIST

## 2021-02-22 NOTE — PLAN OF CARE
Orthopaedics and [address] [place] [address] DR JOHN MORRISON                   Physical Therapy Treatment Note/ Progress Report:    Physical Therapy Re-Certification Plan of Care    Dear Dr. Quyen Albert,    We had the pleasure of treating the following patient for physical therapy services at [address] [place] [address]. A summary of our findings can be found in the updated assessment below. This includes our plan of care. If you have any questions or concerns regarding these findings, please do not hesitate to contact me at the office phone number checked above. Thank you for the referral.     Physician Signature:________________________________Date:__________________  By signing above (or electronic signature), therapists plan is approved by physician    Date Range Of Visits:   Total Visits to Date: 5  Overall Response to Treatment:   [x] Patient is responding well to treatment and improvement is noted with regards to goals   [] Patient should continue to improve in reasonable time if they continue HEP   [] Patient has plateaued and is no longer responding to skilled PT intervention    [] Patient is getting worse and would benefit from return to referring MD   [] Patient unable to adhere to initial POC   [] Other: Pt adam tx well. He did do several Biodex balance items over what asked. Thus, we did not do the different ones. He is improving. He does have some strength deficits. His ROM has progressed significantly. His balance is also improving. I've asked him to bring his brace and clothing to get on the Alter NV. He is agreeable. Pt would continue to benefit from skilled PT to improve strength, ROM, and function. Recommend continuing tx 1-2x/wk x 4-6 wks. Date:  2021    Patient Name:  Wai Yang    :  1990  MRN: [id number]  Restrictions/Precautions:    Medical/Treatment Diagnosis Information:  · Diagnosis: healing left ankle fracture.   [address] (ICD-10-CM) - Acute left ankle pain. Onset-21 Nov 2020  · Treatment Diagnosis: M25.572 (ICD-10-CM) - Acute left ankle pain  Insurance/Certification information:  PT Insurance Information: [employer]  Physician Information:  Referring Practitioner: Christi Best  Has the plan of care been signed (Y/N):        []  Yes  [x]  No     Date of Patient follow up with Physician: 15 Mar 21      Is this a Progress Report:     [x]  Yes  []  No        If Yes:  Date Range for reporting period:  Beginning 1/19/21  Ending 22 Feb 21    Progress report will be due (10 Rx or 30 days whichever is less): visit 15 or 22 Mar 21      Recertification will be due (POC Duration  / 90 days whichever is less): see above        Visit # Insurance Allowable Auth Required   5 60 []  Yes [x]  No        Functional Scale: UEFI-72.5%    Date assessed: 2/22/2021    Latex Allergy:  [x]NO      []YES  Preferred Language for Healthcare:   [x]English       []other:    Pain level: 0/10    SUBJECTIVE:  The brace has been helpful. He has had to figure out how tight to apply it as it was irritating at first.  He is feeling better with it now though. He stated the MD said his fracture is still healing, but he should be able to play volleyball by April.     OBJECTIVE: 22 Feb 21    Observation:    Test measurements:       ROM PROM AROM Comments    Left Right Left Right    Dorsiflexion   15     Plantarflexion   56     Inversion   32     Eversion   18             Knee flexion        Knee extension                  Strength Left Right Comments   Dorsiflexion 4+     Plantarflexion 4+     Inversion 4     Eversion 4-         Girth Left Right Comments   Figure 8      Transmalleolar      MTP                    Balance-SL Left Right   EO      EC     EO foam     EC foam                RESTRICTIONS/PRECAUTIONS: allergic to peanuts    Exercises/Interventions:     Exercise/Equipment Resistance/Repetitions Other comments   ROM     ABC'S 1x    BAPS     Circles 30x CW/CCW   Ankle Pumps 30x    Inversion/Eversion 30x         Rocks     Towels     Toe curls     Bottle Roll               Stretching     Towel Pull 1    Towel Pull 2    Calf 1     Calf 2     Incline Stretch 5x:30 gastroc and soleus   Stair Stretch     Pro-Stretch     Toe Extension     ERMI          Hamstring                         Isometrics     Dorsiflexion    Plantarflexion    Inversion    Eversion         PRE's     Dorsiflexion 3x10 2 lg wts, 1 sm wt    Plantarflexion 3x10 1 lg wts, 1 sm wt    Inversion 3x10 1 lg wt, 1 sm wt    Eversion 3x10 1 lg wt, 1 sm wt         SLR flex     SLR ABD     SLR ADD     SLR ext          Calf Raises     Calf Raises off step          Soleus Calf raises     squats 3x10 To rockerboard NV        Step Up          Knee Extension     Hamstring Curls               Leg Press               Balance:     Rocker Board 3x10 taps A/P SL  30x tap L/R BLE    BOSU Step up and pause x10    SLS 5x:30 Airex    Aeromat     Foam Roll     Plyoback     Tandem Stance     Biodex     RC 4' x3 L9    Balance beam                    Bike 8' L6  3' warm up; :30 up tempo/:30 recovery for last 5'    Treadmill     AlterG NV? [facility] Walks                      Access Code: [email or web address]  URL: [email or web address]  Date: 02/03/2021  Prepared by: Fadia Guidry    Exercises  Supine Ankle Pumps - 10 reps - 3 sets - 2x daily - 7x weekly  Supine Ankle Inversion Eversion AROM - 10 reps - 3 sets - 2x daily - 7x weekly  Supine Ankle Circles - 10 reps - 3 sets - 2x daily - 7x weekly  Seated Ankle Alphabet - 10 reps - 3 sets - 2x daily - 7x weekly  Gastroc Stretch on Wall - 5 sets - 30 hold - 2x daily - 7x weekly  Soleus Stretch on Wall - 5 sets - 30 hold - 2x daily - 7x weekly  Long Sitting Ankle Dorsiflexion with Anchored Resistance - 10 reps - 3 sets - 2x daily - 7x weekly  Long Sitting Ankle Plantar Flexion with Resistance - 10 reps - 3 sets - 2x daily - 7x weekly  Long Sitting Ankle Inversion with Resistance - 10 reps - 3 sets - 2x daily - 7x weekly  Long Sitting Ankle Eversion with Resistance - 10 reps - 3 sets - 2x daily - 7x weekly  Single Leg Stance - 5 sets - 30 hold - 1-2x daily - 7x weekly  Standing Heel Raise - 10 reps - 3 sets - 1-2x daily - 7x weekly    Therapeutic Exercise and NMR EXR  [x] (38197) Provided verbal/tactile cueing for activities related to strengthening, flexibility, endurance, ROM for improvements in LE, proximal hip, and core control with self care, mobility, lifting, ambulation. [x] (26780) Provided verbal/tactile cueing for activities related to improving balance, coordination, kinesthetic sense, posture, motor skill, proprioception  to assist with LE, proximal hip, and core control in self care, mobility, lifting, ambulation and eccentric single leg control.      NMR and Therapeutic Activities:    [x] (70968 or 33376) Provided verbal/tactile cueing for activities related to improving balance, coordination, kinesthetic sense, posture, motor skill, proprioception and motor activation to allow for proper function of core, proximal hip and LE with self care and ADLs  [x] (00739) Gait Re-education- Provided training and instruction to the patient for proper LE, core and proximal hip recruitment and positioning and eccentric body weight control with ambulation re-education including up and down stairs     Home Exercise Program:    [x] (64495) Reviewed/Progressed HEP activities related to strengthening, flexibility, endurance, ROM of core, proximal hip and LE for functional self-care, mobility, lifting and ambulation/stair navigation   [x] (51161)Reviewed/Progressed HEP activities related to improving balance, coordination, kinesthetic sense, posture, motor skill, proprioception of core, proximal hip and LE for self care, mobility, lifting, and ambulation/stair navigation      Manual Treatments:  PROM / STM / Oscillations-Mobs:  G-I, II, III, IV (PA's, Inf., Post.)  [x] (43130) Provided manual therapy to mobilize LE, proximal hip and/or LS spine soft tissue/joints for the purpose of modulating pain, promoting relaxation,  increasing ROM, reducing/eliminating soft tissue swelling/inflammation/restriction, improving soft tissue extensibility and allowing for proper ROM for normal function with self care, mobility, lifting and ambulation. Modalities:      Charges:   Timed Code Treatment Minutes: 39'   Total Treatment Minutes: 79'     [] EVAL (LOW) 455 1011   [] EVAL (MOD) 42748   [] EVAL (HIGH) 33315   [] RE-EVAL   [x] KE(12960) x 1    [] IONTO  [x] NMR (14452) x1     [] VASO  [] Manual (99355) x      [] Other: gt  [x] TA x 1     [] Mech Traction (30259)  [] ES(attended) (77993)      [] ES (un) (71750):         GOALS:   Patient stated goal: regain ability to be on feet all day for work; athleticism     [x] Progressing: [] Met: [] Not Met: [] Adjusted    Therapist goals for Patient:   Short Term Goals: To be achieved in: 2 weeks  1. Independent in HEP and progression per patient tolerance, in order to prevent re-injury. [] Progressing: [x] Met: [] Not Met: [] Adjusted   2. Patient will have a decrease in pain of 4/10 at worst to facilitate improvement in movement, function, and ADLs as indicated by Functional Deficits. [] Progressing: [x] Met: [] Not Met: [] Adjusted    Long Term Goals: To be achieved in: 6 weeks  1. Pt will score 90% or better for the LEFS to assist with reaching prior level of function. [x] Progressing: [] Met: [] Not Met: [] Adjusted  2. Patient will demonstrate increased AROM in dorsiflexion greater than or equal to 10, plantarflexion greater than or equal to 50, inversion greater than or equal to 35 to allow for proper joint functioning as indicated by patients functional deficits. [x] Progressing: [] Met: [] Not Met: [] Adjusted  3. Patient will demonstrate an increase in strength greater than or equal to 4+ to allow for proper functional mobility as indicated by patients functional deficits. [x] Progressing: [] Met: [] Not Met: [] Adjusted  4. Patient will return to work related activities without increased symptoms or restriction. [x] Progressing: [] Met: [] Not Met: [] Adjusted  5. Pt will report pain at worst less than or equal to 2/10. [] Progressing: [x] Met: [] Not Met: [] Adjusted   5. Pt will return to sport related activities without c/o. [x] Progressing: [] Met: [] Not Met: [] Adjusted    Overall Progression Towards Functional goals/ Treatment Progress Update:  [] Patient is progressing as expected towards functional goals listed. [] Progression is slowed due to complexities/Impairments listed. [] Progression has been slowed due to co-morbidities. [x] Plan just implemented, too soon to assess goals progression <30days   [] Goals require adjustment due to lack of progress  [] Patient is not progressing as expected and requires additional follow up with physician  [] Other    Prognosis for POC: [x] Good [] Fair  [] Poor      Patient requires continued skilled intervention: [x] Yes  [] No    Treatment/Activity Tolerance:  [x] Patient able to complete treatment  [] Patient limited by fatigue  [] Patient limited by pain     [] Patient limited by other medical complications  [] Other: Pt adam tx well. He did do several Biodex balance items over what asked. Thus, we did not do the different ones. He is improving. He does have some strength deficits. His ROM has progressed significantly. His balance is also improving. I've asked him to bring his brace and clothing to get on the AlterG NV. He is agreeable. Pt would continue to benefit from skilled PT to improve strength, ROM, and function. Recommend continuing tx 1-2x/wk x 4-6 wks. PLAN: If pt doesn't return, this note can be considered a D/C note. Progress gt, balance, and strength.  Consider soleus calf raise on machine and returning to sport activities starting on AlterG/elliptical.   [x] Continue per plan of care [] Teo Prater current plan (see comments above)  [] Plan of care initiated [] Hold pending MD visit [] Discharge  Electronically signed by: Ismael Rodriguez DPT [id number]

## 2021-03-03 ENCOUNTER — HOSPITAL ENCOUNTER (OUTPATIENT)
Dept: PHYSICAL THERAPY | Age: 31
Setting detail: THERAPIES SERIES
Discharge: HOME | End: 2021-03-03
Payer: COMMERCIAL

## 2021-03-03 PROCEDURE — 97530 THERAPEUTIC ACTIVITIES: CPT | Performed by: PHYSICAL THERAPIST

## 2021-03-03 PROCEDURE — 97112 NEUROMUSCULAR REEDUCATION: CPT | Performed by: PHYSICAL THERAPIST

## 2021-03-03 PROCEDURE — 97110 THERAPEUTIC EXERCISES: CPT | Performed by: PHYSICAL THERAPIST

## 2021-03-03 NOTE — FLOWSHEET NOTE
Orthopaedics and [address] [place] [address] DR JOHN MORRISON                   Physical Therapy Treatment Note/ Progress Report:           Date:  3/3/2021    Patient Name:  Joe Phipps    :  1990  MRN: [id number]  Restrictions/Precautions:    Medical/Treatment Diagnosis Information:  · Diagnosis: healing left ankle fracture. M25.572 (ICD-10-CM) - Acute left ankle pain. Onset-2020  · Treatment Diagnosis: M25.572 (ICD-10-CM) - Acute left ankle pain  Insurance/Certification information:  PT Insurance Information: Yessi  Physician Information:  Referring Practitioner: Terral Dancer  Has the plan of care been signed (Y/N):        []  Yes  [x]  No     Date of Patient follow up with Physician: 15 Mar 21      Is this a Progress Report:     [x]  Yes  []  No        If Yes:  Date Range for reporting period:  Beginning 21  Ending     Progress report will be due (10 Rx or 30 days whichever is less): visit 15 or 22 Mar 21      Recertification will be due (POC Duration  / 90 days whichever is less): see above        Visit # Insurance Allowable Auth Required   6 60 []  Yes [x]  No        Functional Scale: UEFI-72.5%    Date assessed: 2021    Latex Allergy:  [x]NO      []YES  Preferred Language for Healthcare:   [x]English       []other:    Pain level: 0/10    SUBJECTIVE:  Yesterday was the first his leg has hurt since he broke his leg. It didn't feel like a quirky weather thing as the weather wasn't bad. It hurt driving to work. On and off all day, the pain would go to 5/10. The pain was brief and never lasted for more than 5 seconds. He feels fine today and doesn't have pain. He doesn't recall any reason why it would hurt. It felt better once he got home and got off it and took some Aleve.       OBJECTIVE:     Observation:    Test measurements:       ROM PROM AROM Comments    Left Right Left Right    Dorsiflexion   15     Plantarflexion   56     Inversion   32     Eversion   18 Knee flexion        Knee extension                  Strength Left Right Comments   Dorsiflexion 4+     Plantarflexion 4+     Inversion 4     Eversion 4-         Girth Left Right Comments   Figure 8      Transmalleolar      MTP                    Balance-SL Left Right   EO      EC     EO foam     EC foam                RESTRICTIONS/PRECAUTIONS: allergic to peanuts    Exercises/Interventions:     Exercise/Equipment Resistance/Repetitions Other comments   ROM     ABC'S 1x    BAPS     Circles 30x CW/CCW   Ankle Pumps 30x    Inversion/Eversion 30x         Rocks     Towels     Toe curls     Bottle Roll               Stretching     Towel Pull 1    Towel Pull 2    Calf 1     Calf 2     Incline Stretch 5x:30 gastroc and soleus   Stair Stretch     Pro-Stretch     Toe Extension     ERMI          Hamstring                         Isometrics     Dorsiflexion    Plantarflexion    Inversion    Eversion         PRE's     Dorsiflexion 3x10 3 lg wts    Plantarflexion 3x10 2 lg wts, 1 sm wt    Inversion 3x10 2 lg wt, 1 sm wt    Eversion 3x10 2 lg wt, 1 sm wt         SLR flex     SLR ABD     SLR ADD     SLR ext          Calf Raises 3x10  Quick calves 3x:30    Calf Raises off step          Soleus Calf raises     squats 3x10 To rockerboard NV        Step Up          Knee Extension     Hamstring Curls               Leg Press               Balance:     Rocker Board 3x10 taps A/P SL  30x tap L/R BLE    BOSU     SLS 5x:30 Airex  3x:20 EC tandem stance each way    Aeromat     Foam Roll     Plyoback     Tandem Stance     Biodex         Balance beam                    Bike     Treadmill     AlterG 3' walk 70% walk  1' run/1' walk at 70% x 4  30x ankle jumps at 50%  50x lunge jumps at 50%  :30 walk at 70%  :30 walk at 80%  :30 walk at 90%  :30 walk at 100%              [facility] Walks                      Access Code: [email or web address]  URL: [email or web address]  Date: 02/03/2021  Prepared by: Silas Mendes [facility]    Exercises  Supine Ankle Pumps - 10 reps - 3 sets - 2x daily - 7x weekly  Supine Ankle Inversion Eversion AROM - 10 reps - 3 sets - 2x daily - 7x weekly  Supine Ankle Circles - 10 reps - 3 sets - 2x daily - 7x weekly  Seated Ankle Alphabet - 10 reps - 3 sets - 2x daily - 7x weekly  Gastroc Stretch on Wall - 5 sets - 30 hold - 2x daily - 7x weekly  Soleus Stretch on Wall - 5 sets - 30 hold - 2x daily - 7x weekly  Long Sitting Ankle Dorsiflexion with Anchored Resistance - 10 reps - 3 sets - 2x daily - 7x weekly  Long Sitting Ankle Plantar Flexion with Resistance - 10 reps - 3 sets - 2x daily - 7x weekly  Long Sitting Ankle Inversion with Resistance - 10 reps - 3 sets - 2x daily - 7x weekly  Long Sitting Ankle Eversion with Resistance - 10 reps - 3 sets - 2x daily - 7x weekly  Single Leg Stance - 5 sets - 30 hold - 1-2x daily - 7x weekly  Standing Heel Raise - 10 reps - 3 sets - 1-2x daily - 7x weekly    Therapeutic Exercise and NMR EXR  [x] (11781) Provided verbal/tactile cueing for activities related to strengthening, flexibility, endurance, ROM for improvements in LE, proximal hip, and core control with self care, mobility, lifting, ambulation. [x] (64514) Provided verbal/tactile cueing for activities related to improving balance, coordination, kinesthetic sense, posture, motor skill, proprioception  to assist with LE, proximal hip, and core control in self care, mobility, lifting, ambulation and eccentric single leg control.      NMR and Therapeutic Activities:    [x] (40370 or 61324) Provided verbal/tactile cueing for activities related to improving balance, coordination, kinesthetic sense, posture, motor skill, proprioception and motor activation to allow for proper function of core, proximal hip and LE with self care and ADLs  [x] (97776) Gait Re-education- Provided training and instruction to the patient for proper LE, core and proximal hip recruitment and positioning and eccentric body weight control with ambulation re-education including up and achieved in: 6 weeks  1. Pt will score 90% or better for the LEFS to assist with reaching prior level of function. [x] Progressing: [] Met: [] Not Met: [] Adjusted  2. Patient will demonstrate increased AROM in dorsiflexion greater than or equal to 10, plantarflexion greater than or equal to 50, inversion greater than or equal to 35 to allow for proper joint functioning as indicated by patients functional deficits. [x] Progressing: [] Met: [] Not Met: [] Adjusted  3. Patient will demonstrate an increase in strength greater than or equal to 4+ to allow for proper functional mobility as indicated by patients functional deficits. [x] Progressing: [] Met: [] Not Met: [] Adjusted  4. Patient will return to work related activities without increased symptoms or restriction. [x] Progressing: [] Met: [] Not Met: [] Adjusted  5. Pt will report pain at worst less than or equal to 2/10. [] Progressing: [x] Met: [] Not Met: [] Adjusted   5. Pt will return to sport related activities without c/o. [x] Progressing: [] Met: [] Not Met: [] Adjusted    Overall Progression Towards Functional goals/ Treatment Progress Update:  [] Patient is progressing as expected towards functional goals listed. [] Progression is slowed due to complexities/Impairments listed. [] Progression has been slowed due to co-morbidities. [x] Plan just implemented, too soon to assess goals progression <30days   [] Goals require adjustment due to lack of progress  [] Patient is not progressing as expected and requires additional follow up with physician  [] Other    Prognosis for POC: [x] Good [] Fair  [] Poor      Patient requires continued skilled intervention: [x] Yes  [] No    Treatment/Activity Tolerance:  [x] Patient able to complete treatment  [] Patient limited by fatigue  [] Patient limited by pain     [] Patient limited by other medical complications  [] Other: Pt adam tx well.   He was able to run on AlterG and do some jumps on the AlterG without c/o. I did modify some of the balance today due to changing and doing more functional activities. The quick ankle and AlterG activities were all performed with the ASO brace on. Pt would continue to benefit from skilled PT to improve strength, ROM, and function. PLAN: If pt doesn't return, this note can be considered a D/C note. Progress gt, balance, and strength. Progress running and jumping.    [x] Continue per plan of care [] Alter current plan (see comments above)  [] Plan of care initiated [] Hold pending MD visit [] Discharge  Electronically signed by: Talha Horton DPT [id number]

## 2021-03-15 ENCOUNTER — OFFICE VISIT (OUTPATIENT)
Dept: ORTHOPEDIC SURGERY | Age: 31
End: 2021-03-15
Payer: COMMERCIAL

## 2021-03-15 VITALS
HEIGHT: 72 IN | DIASTOLIC BLOOD PRESSURE: 75 MMHG | BODY MASS INDEX: 23.7 KG/M2 | TEMPERATURE: 97.1 F | WEIGHT: 175 LBS | SYSTOLIC BLOOD PRESSURE: 140 MMHG | HEART RATE: 72 BPM

## 2021-03-15 DIAGNOSIS — S82.445D CLOSED NONDISPLACED SPIRAL FRACTURE OF SHAFT OF LEFT FIBULA WITH ROUTINE HEALING, SUBSEQUENT ENCOUNTER: Primary | ICD-10-CM

## 2021-03-15 PROCEDURE — 99213 OFFICE O/P EST LOW 20 MIN: CPT | Performed by: ORTHOPAEDIC SURGERY

## 2021-03-15 NOTE — LETTER
[facility] [address] [address] Na[place] [address]  Phone: [phone]  Fax: [phone]    Mariama Guzman MD        March 15, 2021     Patient: Wendi Ashby   YOB: 1990   Date of Visit: 3/15/2021       To Whom It May Concern: It is my medical opinion that Wendi Ashby may return to work on 03-16-21 full duty. If you have any questions or concerns, please don't hesitate to call.     Sincerely,      Mariama Guzman MD

## 2021-03-15 NOTE — PROGRESS NOTES
Radha Carver returns today to follow-up his left ankle fracture. He is doing well and reports no pain at this time. He feels ready to resume full duty unrestricted work. Patient's medications, allergies, past medical, surgical, social and family histories were reviewed and updated as appropriate. Relevant review of systems reviewed. Ankle today has no swelling. He has no tenderness. He has full motion and normal stability. He has no pain with squatting or toe raise. X-rays obtained today in the office and interpreted by me AP, lateral, and mortise views demonstrate: Healed left ankle fracture    Assessment: Healed left ankle fracture. Plan: I encouraged him to continue with his home exercise program.  He can resume full duty work. Follow-up with me on an as-needed basis. This note was created using voice recognition software. It has been proofread, but occasionally errors remain. Please disregard these errors. They will be corrected as they are noted.

## 2021-03-16 ENCOUNTER — HOSPITAL ENCOUNTER (OUTPATIENT)
Dept: PHYSICAL THERAPY | Age: 31
Setting detail: THERAPIES SERIES
Discharge: HOME | End: 2021-03-16
Payer: COMMERCIAL

## 2021-03-16 PROCEDURE — 97530 THERAPEUTIC ACTIVITIES: CPT | Performed by: PHYSICAL THERAPIST

## 2021-03-16 PROCEDURE — 97112 NEUROMUSCULAR REEDUCATION: CPT | Performed by: PHYSICAL THERAPIST

## 2021-03-16 PROCEDURE — 97110 THERAPEUTIC EXERCISES: CPT | Performed by: PHYSICAL THERAPIST

## 2021-03-16 NOTE — PLAN OF CARE
Orthopaedics and [address] [place] [address] DR JOHN MORRISON                   Physical Therapy Treatment Note/ Progress Report:      Physical Therapy Re-Certification Plan of Care    Dear Dr. Mateo Chanel,    We had the pleasure of treating the following patient for physical therapy services at [address] [place] [address]. A summary of our findings can be found in the updated assessment below. This includes our plan of care. If you have any questions or concerns regarding these findings, please do not hesitate to contact me at the office phone number checked above. Thank you for the referral.     Physician Signature:________________________________Date:__________________  By signing above (or electronic signature), therapists plan is approved by physician    Date Range Of Visits: -3/16/2021  Total Visits to Date: 7  Overall Response to Treatment:   [x] Patient is responding well to treatment and improvement is noted with regards to goals   [] Patient should continue to improve in reasonable time if they continue HEP   [] Patient has plateaued and is no longer responding to skilled PT intervention    [] Patient is getting worse and would benefit from return to referring MD   [] Patient unable to adhere to initial POC   [] Other: Pt adam tx well. He demo significant improvements from coming in being unable to walk in a boot to being able to jumping. He has been able to return to work without pain. He does have a little weakness in his calf and we discussed how to progress this. I did give him a return to running routine as he wants to try to start running. I did also ask him to do his strengthening and balance at least 3x/wk with working on jumping as well. I wrote how to progress to single leg hops as well. If he has any problems or questions with this, he can return or call. Otherwise, pt is D/C to HEP.          Date:  3/16/2021    Patient Name:  Preeti Lizarraga    :  1990  MRN: [id number]  Restrictions/Precautions:    Medical/Treatment Diagnosis Information:  · Diagnosis: healing left ankle fracture. M25.572 (ICD-10-CM) - Acute left ankle pain. Onset-21 Nov 2020  · Treatment Diagnosis: M25.572 (ICD-10-CM) - Acute left ankle pain  Insurance/Certification information:  PT Insurance Information: Yessi  Physician Information:  Referring Practitioner: Hugh Dc  Has the plan of care been signed (Y/N):        []  Yes  [x]  No     Date of Patient follow up with Physician: prn      Is this a Progress Report:     [x]  Yes  []  No        If Yes:  Date Range for reporting period:  Beginning 1/19/21  Ending 16 Mar 21    Progress report will be due (10 Rx or 30 days whichever is less): visit 16 or 13 Apr 21      Recertification will be due (POC Duration  / 90 days whichever is less): see above        Visit # Insurance Allowable Auth Required   7 60 []  Yes [x]  No        Functional Scale: UEFI-92.5%    Date assessed: 16 Mar 2021    Latex Allergy:  [x]NO      []YES  Preferred Language for Healthcare:   [x]English       []other:    Pain level: 0/10    SUBJECTIVE:  Pt saw the MD and he was cleared, and he can return to work. Work has been fine too.       OBJECTIVE: 16 Mar 21    Observation:    Test measurements:       ROM PROM AROM Comments    Left Right Left Right    Dorsiflexion   17     Plantarflexion   52     Inversion   28     Eversion   15             Knee flexion        Knee extension                  Strength Left Right Comments   Dorsiflexion 5     Plantarflexion 4+     Inversion 4+     Eversion 4+         Girth Left Right Comments   Figure 8      Transmalleolar      MTP                    Balance-SL Left Right   EO      EC     EO foam     EC foam                RESTRICTIONS/PRECAUTIONS: allergic to peanuts    Exercises/Interventions:     Exercise/Equipment Resistance/Repetitions Other comments   ROM     ABC'S    BAPS    Circles CW/CCW   Ankle Pumps    Inversion/Eversion         Rocks     Towels Toe curls     Bottle Roll               Stretching     Towel Pull 1    Towel Pull 2    Calf 1     Calf 2     Incline Stretch 5x:30 gastroc and soleus   Stair Stretch     Pro-Stretch     Toe Extension     ERMI          Hamstring                         Isometrics     Dorsiflexion    Plantarflexion    Inversion    Eversion         PRE's     Dorsiflexion 3x10 3 lg wts    Plantarflexion 3x10 2 lg wts, 1 sm wt    Inversion 3x10 2 lg wt, 1 sm wt    Eversion 3x10 2 lg wt, 1 sm wt         SLR flex     SLR ABD     SLR ADD     SLR ext          Calf Raises     Calf Raises off step          Soleus Calf raises     squats      Step Up          Knee Extension     Hamstring Curls               Leg Press               Balance:     Rocker Board     BOSU     SLS   Volleyball passes 3x20 passes single leg    Aeromat     Foam Roll     Plyoback     Tandem Stance     Biodex         Balance beam               Elliptical 8'    Bike     Treadmill     AlterG          Ladder drills 2x down and back  1 in fwd  2 in fwd  1 in lateral  2 in lateral  Mirant jumps 3x:15 A/P and M/L                   [facility]                      Access Code: [email or web address]  URL: [email or web address]  Date: 02/03/2021  Prepared by: Lynn Guidry    Exercises  Supine Ankle Pumps - 10 reps - 3 sets - 2x daily - 7x weekly  Supine Ankle Inversion Eversion AROM - 10 reps - 3 sets - 2x daily - 7x weekly  Supine Ankle Circles - 10 reps - 3 sets - 2x daily - 7x weekly  Seated Ankle Alphabet - 10 reps - 3 sets - 2x daily - 7x weekly  Gastroc Stretch on Wall - 5 sets - 30 hold - 2x daily - 7x weekly  Soleus Stretch on Wall - 5 sets - 30 hold - 2x daily - 7x weekly  Long Sitting Ankle Dorsiflexion with Anchored Resistance - 10 reps - 3 sets - 2x daily - 7x weekly  Long Sitting Ankle Plantar Flexion with Resistance - 10 reps - 3 sets - 2x daily - 7x weekly  Long Sitting Ankle Inversion with Resistance - 10 reps - 3 sets - 2x daily - 7x weekly  Long Sitting Ankle Eversion with Resistance - 10 reps - 3 sets - 2x daily - 7x weekly  Single Leg Stance - 5 sets - 30 hold - 1-2x daily - 7x weekly  Standing Heel Raise - 10 reps - 3 sets - 1-2x daily - 7x weekly    Therapeutic Exercise and NMR EXR  [x] (68696) Provided verbal/tactile cueing for activities related to strengthening, flexibility, endurance, ROM for improvements in LE, proximal hip, and core control with self care, mobility, lifting, ambulation. [x] (58340) Provided verbal/tactile cueing for activities related to improving balance, coordination, kinesthetic sense, posture, motor skill, proprioception  to assist with LE, proximal hip, and core control in self care, mobility, lifting, ambulation and eccentric single leg control.      NMR and Therapeutic Activities:    [x] (70038 or 01615) Provided verbal/tactile cueing for activities related to improving balance, coordination, kinesthetic sense, posture, motor skill, proprioception and motor activation to allow for proper function of core, proximal hip and LE with self care and ADLs  [x] (36386) Gait Re-education- Provided training and instruction to the patient for proper LE, core and proximal hip recruitment and positioning and eccentric body weight control with ambulation re-education including up and down stairs     Home Exercise Program:    [x] (61711) Reviewed/Progressed HEP activities related to strengthening, flexibility, endurance, ROM of core, proximal hip and LE for functional self-care, mobility, lifting and ambulation/stair navigation   [x] (02227)Reviewed/Progressed HEP activities related to improving balance, coordination, kinesthetic sense, posture, motor skill, proprioception of core, proximal hip and LE for self care, mobility, lifting, and ambulation/stair navigation      Manual Treatments:  PROM / STM / Oscillations-Mobs:  G-I, II, III, IV (PA's, Inf., Post.)  [x] (28356) Provided manual therapy to mobilize LE, proximal hip and/or LS spine soft tissue/joints for the purpose of modulating pain, promoting relaxation,  increasing ROM, reducing/eliminating soft tissue swelling/inflammation/restriction, improving soft tissue extensibility and allowing for proper ROM for normal function with self care, mobility, lifting and ambulation. Modalities:      Charges:   Timed Code Treatment Minutes: 39'   Total Treatment Minutes: 54'     [] EVAL (LOW) 455 1011   [] EVAL (MOD) 16843   [] EVAL (HIGH) 11245   [] RE-EVAL   [x] AY(98574) x 1    [] IONTO  [x] NMR (87720) x1     [] VASO  [] Manual (74351) x      [] Other: gt  [x] TA x 1     [] Mech Traction (19960)  [] ES(attended) (12898)      [] ES (un) (20090):         GOALS:   Patient stated goal: regain ability to be on feet all day for work; athleticism     [] Progressing: [x] Met: [] Not Met: [] Adjusted    Therapist goals for Patient:   Short Term Goals: To be achieved in: 2 weeks  1. Independent in HEP and progression per patient tolerance, in order to prevent re-injury. [] Progressing: [x] Met: [] Not Met: [] Adjusted   2. Patient will have a decrease in pain of 4/10 at worst to facilitate improvement in movement, function, and ADLs as indicated by Functional Deficits. [] Progressing: [x] Met: [] Not Met: [] Adjusted    Long Term Goals: To be achieved in: 6 weeks  1. Pt will score 90% or better for the LEFS to assist with reaching prior level of function. [] Progressing: [x] Met: [] Not Met: [] Adjusted  2. Patient will demonstrate increased AROM in dorsiflexion greater than or equal to 10, plantarflexion greater than or equal to 50, inversion greater than or equal to 35 to allow for proper joint functioning as indicated by patients functional deficits. [x] Progressing: [] Met: [] Not Met: [] Adjusted  3. Patient will demonstrate an increase in strength greater than or equal to 4+ to allow for proper functional mobility as indicated by patients functional deficits.    [] Progressing: [x] Met: [] Not Met: [] Adjusted  4. Patient will return to work related activities without increased symptoms or restriction. [] Progressing: [] Met: [] Not Met: [] Adjusted  5. Pt will report pain at worst less than or equal to 2/10. [] Progressing: [x] Met: [] Not Met: [] Adjusted   5. Pt will return to sport related activities without c/o. [x] Progressing: [] Met: [] Not Met: [] Adjusted    Overall Progression Towards Functional goals/ Treatment Progress Update:  [x] Patient is progressing as expected towards functional goals listed. [] Progression is slowed due to complexities/Impairments listed. [] Progression has been slowed due to co-morbidities. [] Plan just implemented, too soon to assess goals progression <30days   [] Goals require adjustment due to lack of progress  [] Patient is not progressing as expected and requires additional follow up with physician  [] Other    Prognosis for POC: [x] Good [] Fair  [] Poor      Patient requires continued skilled intervention: [x] Yes  [] No    Treatment/Activity Tolerance:  [x] Patient able to complete treatment  [] Patient limited by fatigue  [] Patient limited by pain     [] Patient limited by other medical complications  [] Other: Pt adam tx well. He demo significant improvements from coming in being unable to walk in a boot to being able to jumping. He has been able to return to work without pain. He does have a little weakness in his calf and we discussed how to progress this. I did give him a return to running routine as he wants to try to start running. I did also ask him to do his strengthening and balance at least 3x/wk with working on jumping as well. I wrote how to progress to single leg hops as well. If he has any problems or questions with this, he can return or call. Otherwise, pt is D/C to HEP. PLAN: If pt doesn't return, this note can be considered a D/C note.   Pt given progressions to return to sport written out for him to return to sand volleyball.    [x] Continue per plan of care [] Alter current plan (see comments above)  [] Plan of care initiated [] Hold pending MD visit [] Discharge  Electronically signed by: Colton Burton DPT [id number]